# Patient Record
Sex: MALE | Race: WHITE | Employment: FULL TIME | URBAN - METROPOLITAN AREA
[De-identification: names, ages, dates, MRNs, and addresses within clinical notes are randomized per-mention and may not be internally consistent; named-entity substitution may affect disease eponyms.]

---

## 2017-03-20 RX ORDER — ATORVASTATIN CALCIUM 10 MG/1
TABLET, FILM COATED ORAL
Qty: 90 TABLET | Refills: 1 | Status: SHIPPED | OUTPATIENT
Start: 2017-03-20 | End: 2017-09-08

## 2017-04-17 ENCOUNTER — OFFICE VISIT (OUTPATIENT)
Dept: DERMATOLOGY CLINIC | Facility: CLINIC | Age: 67
End: 2017-04-17

## 2017-04-17 DIAGNOSIS — Z85.828 PERSONAL HISTORY OF OTHER MALIGNANT NEOPLASM OF SKIN: ICD-10-CM

## 2017-04-17 DIAGNOSIS — D23.5 BENIGN NEOPLASM OF SKIN OF TRUNK, EXCEPT SCROTUM: ICD-10-CM

## 2017-04-17 DIAGNOSIS — L82.1 SEBORRHEIC KERATOSES: ICD-10-CM

## 2017-04-17 DIAGNOSIS — D23.30 BENIGN NEOPLASM OF SKIN OF FACE: ICD-10-CM

## 2017-04-17 DIAGNOSIS — L57.8 SUN-DAMAGED SKIN: Primary | ICD-10-CM

## 2017-04-17 DIAGNOSIS — D23.60 BENIGN NEOPLASM OF SKIN OF UPPER LIMB, INCLUDING SHOULDER, UNSPECIFIED LATERALITY: ICD-10-CM

## 2017-04-17 DIAGNOSIS — D23.4 BENIGN NEOPLASM OF SCALP AND SKIN OF NECK: ICD-10-CM

## 2017-04-17 PROCEDURE — 99213 OFFICE O/P EST LOW 20 MIN: CPT | Performed by: DERMATOLOGY

## 2017-04-17 PROCEDURE — 99212 OFFICE O/P EST SF 10 MIN: CPT | Performed by: DERMATOLOGY

## 2017-04-17 NOTE — PROGRESS NOTES
HPI:     Chief Complaint     Derm Problem        HPI     Derm Problem    Additional comments: here for 4 mo follow up post elliptical excision  right upper chest for [facility], also would like to have back checked       Last edited by Kiko Chery RN on 4/17/2 Family History   Problem Relation Age of Onset   • Heart Disease Father      CAD   • Heart Disease Brother      CAD       PHYSICAL EXAM:   An upper body exam was performed, including face, neck, chest, back, abdomen, r upper extremity, l upper extremit

## 2017-04-17 NOTE — PROGRESS NOTES
Past Medical History   Diagnosis Date   • Basal cell carcinoma of skin of trunk, except scrotum 11/22/2013     per [employer]:   • Other and unspecified hyperlipidemia      per [employer]: medication   • History of colonoscopy 10/27/2007     per [employer]:   • Bas

## 2017-09-08 RX ORDER — ATORVASTATIN CALCIUM 10 MG/1
TABLET, FILM COATED ORAL
Qty: 30 TABLET | Refills: 0 | Status: SHIPPED | OUTPATIENT
Start: 2017-09-08 | End: 2017-10-23

## 2017-09-11 RX ORDER — ATORVASTATIN CALCIUM 10 MG/1
TABLET, FILM COATED ORAL
Qty: 90 TABLET | Refills: 0 | OUTPATIENT
Start: 2017-09-11

## 2017-10-18 NOTE — TELEPHONE ENCOUNTER
Current Outpatient Prescriptions:  Sildenafil Citrate (VIAGRA) 50 MG Oral Tab TAKE 1 TABLET BY MOUTH AS NEEDED Disp: 8 tablet Rfl: 10

## 2017-10-18 NOTE — TELEPHONE ENCOUNTER
Last filled 9/23/2016 8 tablets with 8 refills. lov 7/16/2016    Pended and sent to provider per protocol, thank you.

## 2017-10-23 ENCOUNTER — OFFICE VISIT (OUTPATIENT)
Dept: FAMILY MEDICINE CLINIC | Facility: CLINIC | Age: 67
End: 2017-10-23

## 2017-10-23 VITALS
SYSTOLIC BLOOD PRESSURE: 137 MMHG | BODY MASS INDEX: 28 KG/M2 | HEART RATE: 51 BPM | WEIGHT: 212 LBS | DIASTOLIC BLOOD PRESSURE: 75 MMHG

## 2017-10-23 DIAGNOSIS — H61.22 IMPACTED CERUMEN OF LEFT EAR: Primary | ICD-10-CM

## 2017-10-23 PROCEDURE — 69209 REMOVE IMPACTED EAR WAX UNI: CPT | Performed by: FAMILY MEDICINE

## 2017-10-23 PROCEDURE — 99213 OFFICE O/P EST LOW 20 MIN: CPT | Performed by: FAMILY MEDICINE

## 2017-10-23 PROCEDURE — 99212 OFFICE O/P EST SF 10 MIN: CPT | Performed by: FAMILY MEDICINE

## 2017-10-23 RX ORDER — ATORVASTATIN CALCIUM 10 MG/1
TABLET, FILM COATED ORAL
Qty: 90 TABLET | Refills: 3 | Status: SHIPPED | OUTPATIENT
Start: 2017-10-23 | End: 2017-10-27

## 2017-10-23 RX ORDER — CIPROFLOXACIN AND DEXAMETHASONE 3; 1 MG/ML; MG/ML
4 SUSPENSION/ DROPS AURICULAR (OTIC) 2 TIMES DAILY
Qty: 1 BOTTLE | Refills: 0 | Status: SHIPPED | OUTPATIENT
Start: 2017-10-23 | End: 2017-10-27

## 2017-10-23 NOTE — PROGRESS NOTES
HPI:    Patient ID: Ruma Cunningham is a 78year old male. HPI  Patient presents with:  Hearing Problem: pt has hearing loss in left ear    Review of Systems   HENT: Positive for hearing loss. Negative for ear pain and tinnitus.              Current Outp

## 2017-10-25 ENCOUNTER — TELEPHONE (OUTPATIENT)
Dept: FAMILY MEDICINE CLINIC | Facility: CLINIC | Age: 67
End: 2017-10-25

## 2017-10-25 RX ORDER — SILDENAFIL 50 MG/1
TABLET, FILM COATED ORAL
Qty: 30 TABLET | Refills: 0 | Status: SHIPPED | OUTPATIENT
Start: 2017-10-25 | End: 2019-01-16

## 2017-10-27 ENCOUNTER — LAB ENCOUNTER (OUTPATIENT)
Dept: LAB | Age: 67
End: 2017-10-27
Attending: FAMILY MEDICINE
Payer: COMMERCIAL

## 2017-10-27 ENCOUNTER — OFFICE VISIT (OUTPATIENT)
Dept: FAMILY MEDICINE CLINIC | Facility: CLINIC | Age: 67
End: 2017-10-27

## 2017-10-27 VITALS
DIASTOLIC BLOOD PRESSURE: 81 MMHG | TEMPERATURE: 98 F | WEIGHT: 209 LBS | RESPIRATION RATE: 18 BRPM | SYSTOLIC BLOOD PRESSURE: 123 MMHG | HEART RATE: 56 BPM | BODY MASS INDEX: 28.31 KG/M2 | HEIGHT: 72 IN

## 2017-10-27 DIAGNOSIS — Z00.00 ROUTINE GENERAL MEDICAL EXAMINATION AT A HEALTH CARE FACILITY: Primary | ICD-10-CM

## 2017-10-27 DIAGNOSIS — E78.00 HYPERCHOLESTEROLEMIA: ICD-10-CM

## 2017-10-27 DIAGNOSIS — Z12.11 COLON CANCER SCREENING: ICD-10-CM

## 2017-10-27 DIAGNOSIS — Z00.00 ROUTINE GENERAL MEDICAL EXAMINATION AT A HEALTH CARE FACILITY: ICD-10-CM

## 2017-10-27 PROCEDURE — 80053 COMPREHEN METABOLIC PANEL: CPT

## 2017-10-27 PROCEDURE — 80061 LIPID PANEL: CPT

## 2017-10-27 PROCEDURE — 81003 URINALYSIS AUTO W/O SCOPE: CPT

## 2017-10-27 PROCEDURE — 36415 COLL VENOUS BLD VENIPUNCTURE: CPT

## 2017-10-27 PROCEDURE — 99397 PER PM REEVAL EST PAT 65+ YR: CPT | Performed by: FAMILY MEDICINE

## 2017-10-27 PROCEDURE — 85025 COMPLETE CBC W/AUTO DIFF WBC: CPT

## 2017-10-27 RX ORDER — ATORVASTATIN CALCIUM 10 MG/1
TABLET, FILM COATED ORAL
Qty: 90 TABLET | Refills: 3 | Status: SHIPPED | OUTPATIENT
Start: 2017-10-27 | End: 2018-11-10

## 2017-10-27 NOTE — PROGRESS NOTES
HPI:    Patient ID: Jesus Alberto Cox is a 78year old male.     HPI  Patient presents with:  Checkup  Hyperlipidemia  Medication Request    Past Medical History:   Diagnosis Date   • Basal cell carcinoma 10-15-16    R upper chest   • Basal cell carcinoma of papilledema. Neck: Trachea normal and normal range of motion. Neck supple. Normal carotid pulses and no JVD present. Cardiovascular: Regular rhythm and normal heart sounds.     Pulses:       Carotid pulses are 2+ on the right side, and 2+ on the left si

## 2017-11-10 ENCOUNTER — TELEPHONE (OUTPATIENT)
Dept: FAMILY MEDICINE CLINIC | Facility: CLINIC | Age: 67
End: 2017-11-10

## 2017-11-10 NOTE — TELEPHONE ENCOUNTER
----- Message from Beronica Suarez DO sent at 11/10/2017  7:58 AM CST -----  Please call patient and inform that the laboratory results are acceptable range. Mild elevation of glucose and cholesterol best addressed with lower carb diet and exercise.  See

## 2017-11-11 NOTE — TELEPHONE ENCOUNTER
Spoke with patient (identified name and ), results reviewed and agrees with plan.   JOANNE reference Understanding Carbohydrates

## 2018-06-15 ENCOUNTER — OFFICE VISIT (OUTPATIENT)
Dept: FAMILY MEDICINE CLINIC | Facility: CLINIC | Age: 68
End: 2018-06-15

## 2018-06-15 ENCOUNTER — APPOINTMENT (OUTPATIENT)
Dept: LAB | Age: 68
End: 2018-06-15
Attending: FAMILY MEDICINE
Payer: COMMERCIAL

## 2018-06-15 VITALS
HEART RATE: 51 BPM | TEMPERATURE: 98 F | DIASTOLIC BLOOD PRESSURE: 72 MMHG | SYSTOLIC BLOOD PRESSURE: 116 MMHG | WEIGHT: 213 LBS | BODY MASS INDEX: 29 KG/M2

## 2018-06-15 DIAGNOSIS — Z12.11 COLON CANCER SCREENING: ICD-10-CM

## 2018-06-15 DIAGNOSIS — E78.00 HYPERCHOLESTEROLEMIA: ICD-10-CM

## 2018-06-15 DIAGNOSIS — E78.00 HYPERCHOLESTEROLEMIA: Primary | ICD-10-CM

## 2018-06-15 PROCEDURE — 80061 LIPID PANEL: CPT

## 2018-06-15 PROCEDURE — 99213 OFFICE O/P EST LOW 20 MIN: CPT | Performed by: FAMILY MEDICINE

## 2018-06-15 PROCEDURE — 84450 TRANSFERASE (AST) (SGOT): CPT

## 2018-06-15 PROCEDURE — 36415 COLL VENOUS BLD VENIPUNCTURE: CPT

## 2018-06-15 PROCEDURE — 99212 OFFICE O/P EST SF 10 MIN: CPT | Performed by: FAMILY MEDICINE

## 2018-06-15 PROCEDURE — 84460 ALANINE AMINO (ALT) (SGPT): CPT

## 2018-06-19 NOTE — H&P
[address] [facility] [address] Gastroenterology                                                                                                  Clinic History and Physical     Pa • Other and unspecified hyperlipidemia     per [employer]: medication   • Skin cancer 10-15-16    BCC, R upper chest      Past Surgical History:  2008: COLONOSCOPY      Comment: at [facility]   Family Hx:   Family History   Problem Relation Age of Onset   • Heart the sclera appears anicteric, oropharynx clear, mucus membranes appear moist  CV: regular rate and rhythm, the extremities are warm and well perfused   Lung: effort normal and breath sounds normal, no respiratory distress, wheezes or rales  GI: soft, non-t prolonged hospitalization or surgical intervention. I also specifically mentioned the miss rate of colonoscopy of 5-10% in the best of all circumstances. All questions were answered to the patient’s satisfaction.  The patient signed informed consent and sai

## 2018-06-27 ENCOUNTER — TELEPHONE (OUTPATIENT)
Dept: GASTROENTEROLOGY | Facility: CLINIC | Age: 68
End: 2018-06-27

## 2018-06-27 ENCOUNTER — OFFICE VISIT (OUTPATIENT)
Dept: GASTROENTEROLOGY | Facility: CLINIC | Age: 68
End: 2018-06-27

## 2018-06-27 VITALS
BODY MASS INDEX: 29.23 KG/M2 | SYSTOLIC BLOOD PRESSURE: 126 MMHG | DIASTOLIC BLOOD PRESSURE: 81 MMHG | HEIGHT: 72 IN | WEIGHT: 215.81 LBS | HEART RATE: 56 BPM

## 2018-06-27 DIAGNOSIS — Z12.11 SCREENING FOR COLON CANCER: Primary | ICD-10-CM

## 2018-06-27 DIAGNOSIS — Z12.11 COLON CANCER SCREENING: Primary | ICD-10-CM

## 2018-06-27 PROCEDURE — 99212 OFFICE O/P EST SF 10 MIN: CPT | Performed by: NURSE PRACTITIONER

## 2018-06-27 PROCEDURE — 99243 OFF/OP CNSLTJ NEW/EST LOW 30: CPT | Performed by: NURSE PRACTITIONER

## 2018-06-27 NOTE — PATIENT INSTRUCTIONS
1. Schedule colonoscopy with Dr. Rich Rodriguez w/ IV Twilight    2.  bowel prep from pharmacy - split dose Colyte    3. Continue all medications for procedure     4. Read all bowel prep instructions carefully    5.  AVOID seeds, nuts, popcorn, raw fru

## 2018-06-27 NOTE — TELEPHONE ENCOUNTER
Scheduled for:  Colonoscopy 72486  Provider Name: Dr. Katalina Parker   Date:  8/24/18  Location:  [facility]  Sedation:  IV  Time:  9:15 am, (pt is aware that [facility] will call the day before to confirm arrival time)  Prep: Colyte  Meds/Allergies Reconciled?:  GENNARO Cornelius

## 2018-08-24 ENCOUNTER — LAB REQUISITION (OUTPATIENT)
Dept: LAB | Facility: HOSPITAL | Age: 68
End: 2018-08-24
Payer: COMMERCIAL

## 2018-08-24 DIAGNOSIS — Z01.89 ENCOUNTER FOR OTHER SPECIFIED SPECIAL EXAMINATIONS: ICD-10-CM

## 2018-08-24 PROCEDURE — 88305 TISSUE EXAM BY PATHOLOGIST: CPT | Performed by: INTERNAL MEDICINE

## 2018-08-27 ENCOUNTER — TELEPHONE (OUTPATIENT)
Dept: GASTROENTEROLOGY | Facility: CLINIC | Age: 68
End: 2018-08-27

## 2018-08-27 NOTE — TELEPHONE ENCOUNTER
I mailed out colonoscopy results letter to pt  Updated health maintenance  Entered into  5 yr recall  Recall colon in 5 years per.  Colon done 8/24/18    GI RN staff: Please send dictated lab letter and enter colonoscopy recall for 5 years.       ----- Mess

## 2018-09-27 ENCOUNTER — TELEPHONE (OUTPATIENT)
Dept: FAMILY MEDICINE CLINIC | Facility: CLINIC | Age: 68
End: 2018-09-27

## 2018-09-27 ENCOUNTER — OFFICE VISIT (OUTPATIENT)
Dept: DERMATOLOGY CLINIC | Facility: CLINIC | Age: 68
End: 2018-09-27

## 2018-09-27 DIAGNOSIS — L57.0 ACTINIC KERATOSIS: Primary | ICD-10-CM

## 2018-09-27 DIAGNOSIS — D23.4 BENIGN NEOPLASM OF SCALP AND SKIN OF NECK: ICD-10-CM

## 2018-09-27 DIAGNOSIS — D23.60 BENIGN NEOPLASM OF SKIN OF UPPER LIMB, INCLUDING SHOULDER, UNSPECIFIED LATERALITY: ICD-10-CM

## 2018-09-27 DIAGNOSIS — D23.5 BENIGN NEOPLASM OF SKIN OF TRUNK, EXCEPT SCROTUM: ICD-10-CM

## 2018-09-27 DIAGNOSIS — D23.30 BENIGN NEOPLASM OF SKIN OF FACE: ICD-10-CM

## 2018-09-27 DIAGNOSIS — D23.70 BENIGN NEOPLASM OF SKIN OF LOWER EXTREMITY, INCLUDING HIP, UNSPECIFIED LATERALITY: ICD-10-CM

## 2018-09-27 DIAGNOSIS — Z12.83 SKIN CANCER SCREENING: Primary | ICD-10-CM

## 2018-09-27 DIAGNOSIS — L57.8 SUN-DAMAGED SKIN: ICD-10-CM

## 2018-09-27 DIAGNOSIS — Z85.828 PERSONAL HISTORY OF SKIN CANCER: ICD-10-CM

## 2018-09-27 DIAGNOSIS — L82.1 SEBORRHEIC KERATOSES: ICD-10-CM

## 2018-09-27 PROCEDURE — 99212 OFFICE O/P EST SF 10 MIN: CPT | Performed by: DERMATOLOGY

## 2018-09-27 PROCEDURE — 99213 OFFICE O/P EST LOW 20 MIN: CPT | Performed by: DERMATOLOGY

## 2018-09-27 PROCEDURE — 17003 DESTRUCT PREMALG LES 2-14: CPT | Performed by: DERMATOLOGY

## 2018-09-27 PROCEDURE — 17000 DESTRUCT PREMALG LESION: CPT | Performed by: DERMATOLOGY

## 2018-09-27 NOTE — TELEPHONE ENCOUNTER
Pt has an appointment today @ [phone] with Dr Arvella Cranker. Please sign referral if you agree.  Thanks, [facility]

## 2018-09-27 NOTE — PROGRESS NOTES
HPI:     Chief Complaint     Full Skin Exam        HPI     Full Skin Exam      Additional comments: LOV: 4-17-17. Pt presents today for f/u full body skin check pt denies something new or changing to the skin he notcied. Pt with personal hx of BCC. education: Not on file      Highest education level: Not on file    Social Needs      Financial resource strain: Not on file      Food insecurity - worry: Not on file      Food insecurity - inability: Not on file      Transportation needs - medical: Not on exam was remarkable for the following:  - there is no evidence of recurrent basal cell carcinomas from the right upper chest or the back.   - melanocytic nevi are uniform in color, shape and borders.   -there are scattered blotchy brown macules on face, arely Referral Orders:  No orders of the defined types were placed in this encounter.         9/27/2018  Tutu Mendes

## 2018-11-12 RX ORDER — ATORVASTATIN CALCIUM 10 MG/1
TABLET, FILM COATED ORAL
Qty: 90 TABLET | Refills: 0 | Status: SHIPPED | OUTPATIENT
Start: 2018-11-12 | End: 2019-01-25

## 2019-01-17 ENCOUNTER — TELEPHONE (OUTPATIENT)
Dept: FAMILY MEDICINE CLINIC | Facility: CLINIC | Age: 69
End: 2019-01-17

## 2019-01-17 RX ORDER — SILDENAFIL 50 MG/1
TABLET, FILM COATED ORAL
Qty: 15 TABLET | Refills: 0 | Status: SHIPPED | OUTPATIENT
Start: 2019-01-17 | End: 2019-05-24

## 2019-01-18 NOTE — TELEPHONE ENCOUNTER
PA for Sildenafil Citrate 50 mg tab completed with [employer] via CMM response time 3-5 business days KEY [id number].

## 2019-01-24 NOTE — TELEPHONE ENCOUNTER
PA approved #8/30 days effective 1/19/2019-1/19/2020.  [facility] notified of the approval.

## 2019-01-25 RX ORDER — ATORVASTATIN CALCIUM 10 MG/1
TABLET, FILM COATED ORAL
Qty: 90 TABLET | Refills: 0 | Status: SHIPPED | OUTPATIENT
Start: 2019-01-25 | End: 2019-05-21

## 2019-05-22 RX ORDER — ATORVASTATIN CALCIUM 10 MG/1
TABLET, FILM COATED ORAL
Qty: 90 TABLET | Refills: 0 | Status: SHIPPED | OUTPATIENT
Start: 2019-05-22 | End: 2019-09-06

## 2019-05-22 NOTE — TELEPHONE ENCOUNTER
CSS please assist patient in scheduling a follow up appointment - thank you     Patient was to make follow up appt - none scheduled - LOV 6-15-18

## 2019-05-22 NOTE — TELEPHONE ENCOUNTER
See message below. Per note on med record pt due for OV. Per message below pt declines to schedule.   Please advise to refill request.

## 2019-05-22 NOTE — TELEPHONE ENCOUNTER
Patient returned call and does not want to schedule an appointment at this time,     He would just like his medication refilled.

## 2019-05-24 RX ORDER — SILDENAFIL 50 MG/1
TABLET, FILM COATED ORAL
Qty: 15 TABLET | Refills: 0 | OUTPATIENT
Start: 2019-05-24

## 2019-05-28 RX ORDER — SILDENAFIL 50 MG/1
TABLET, FILM COATED ORAL
Qty: 15 TABLET | Refills: 1 | Status: SHIPPED | OUTPATIENT
Start: 2019-05-28 | End: 2019-09-06

## 2019-05-28 NOTE — TELEPHONE ENCOUNTER
Received 2nd refill request..... SILDENAFIL 50MG TABLETS  QTY 15  TAKE 1 TABLET BY MOUTH AS NEEDED.    DUE FOR OFFICE VISIT FOR REFILLS

## 2019-05-28 NOTE — TELEPHONE ENCOUNTER
Refill passed per [facility]3], [facility] protocol.   Refill Protocol Appointment Criteria  · Appointment scheduled in the past 12 months or in the next 3 months  Recent Outpatient Visits            8 months ago Actinic keratosis    [facility]

## 2019-08-23 RX ORDER — ATORVASTATIN CALCIUM 10 MG/1
TABLET, FILM COATED ORAL
Qty: 90 TABLET | Refills: 0 | OUTPATIENT
Start: 2019-08-23

## 2019-08-29 NOTE — TELEPHONE ENCOUNTER
Pt scheduled appt on 9/6 and states out of meds and req a refill until this appt and states will be leaving for out of town today for 1 week.

## 2019-09-05 ENCOUNTER — TELEPHONE (OUTPATIENT)
Dept: FAMILY MEDICINE CLINIC | Facility: CLINIC | Age: 69
End: 2019-09-05

## 2019-09-05 NOTE — TELEPHONE ENCOUNTER
Pt calling will like to have blood work done he have a appt with Dr. Mis Pacheco josh at 1:15 will like the blood work done tomorrow morning Sept 6        Please adbise

## 2019-09-06 ENCOUNTER — TELEPHONE (OUTPATIENT)
Dept: FAMILY MEDICINE CLINIC | Facility: CLINIC | Age: 69
End: 2019-09-06

## 2019-09-06 ENCOUNTER — OFFICE VISIT (OUTPATIENT)
Dept: FAMILY MEDICINE CLINIC | Facility: CLINIC | Age: 69
End: 2019-09-06

## 2019-09-06 VITALS
BODY MASS INDEX: 28.44 KG/M2 | HEIGHT: 72 IN | SYSTOLIC BLOOD PRESSURE: 120 MMHG | WEIGHT: 210 LBS | TEMPERATURE: 98 F | DIASTOLIC BLOOD PRESSURE: 71 MMHG | HEART RATE: 58 BPM

## 2019-09-06 DIAGNOSIS — E78.00 HYPERCHOLESTEROLEMIA: Primary | ICD-10-CM

## 2019-09-06 DIAGNOSIS — N52.9 ERECTILE DYSFUNCTION, UNSPECIFIED ERECTILE DYSFUNCTION TYPE: ICD-10-CM

## 2019-09-06 PROCEDURE — 99214 OFFICE O/P EST MOD 30 MIN: CPT | Performed by: FAMILY MEDICINE

## 2019-09-06 RX ORDER — SILDENAFIL 100 MG/1
TABLET, FILM COATED ORAL
Qty: 15 TABLET | Refills: 3 | Status: SHIPPED | OUTPATIENT
Start: 2019-09-06 | End: 2021-03-11

## 2019-09-06 RX ORDER — ATORVASTATIN CALCIUM 10 MG/1
10 TABLET, FILM COATED ORAL
Qty: 90 TABLET | Refills: 3 | Status: SHIPPED | OUTPATIENT
Start: 2019-09-06 | End: 2020-08-05

## 2019-09-06 NOTE — TELEPHONE ENCOUNTER
Pt is calling for status of the orders he requested. Pt would like to do his blood work this morning, before, his afternoon appt with Dr. Julia Davis. Please, call pt at [phone] when the orders are in the system.

## 2019-09-06 NOTE — PROGRESS NOTES
HPI:    Patient ID: Chantal Diaz is a 77year old male. HPI  Patient presents with:  Cholesterol: follow up for cholestrol,   Medication Request: for atorvastatin     Review of Systems   Constitutional: Negative. Respiratory: Negative.     Edna Serna

## 2019-09-07 NOTE — TELEPHONE ENCOUNTER
Prior authorization for Sildenafil Citrate 100MG oral tab completed w/ [employer] on cover my meds Key: [id number], turn around time 1-5 days.

## 2019-09-13 ENCOUNTER — APPOINTMENT (OUTPATIENT)
Dept: LAB | Age: 69
End: 2019-09-13
Attending: FAMILY MEDICINE
Payer: COMMERCIAL

## 2019-09-13 DIAGNOSIS — E78.00 HYPERCHOLESTEROLEMIA: ICD-10-CM

## 2019-09-13 LAB
ALT SERPL-CCNC: 24 U/L (ref 16–61)
AST SERPL-CCNC: 17 U/L (ref 15–37)
CHOLEST SMN-MCNC: 208 MG/DL (ref ?–200)
HDLC SERPL-MCNC: 49 MG/DL (ref 40–59)
LDLC SERPL CALC-MCNC: 114 MG/DL (ref ?–100)
NONHDLC SERPL-MCNC: 159 MG/DL (ref ?–130)
PATIENT FASTING: YES
TRIGL SERPL-MCNC: 226 MG/DL (ref 30–149)
VLDLC SERPL CALC-MCNC: 45 MG/DL (ref 0–30)

## 2019-09-13 PROCEDURE — 84460 ALANINE AMINO (ALT) (SGPT): CPT

## 2019-09-13 PROCEDURE — 80061 LIPID PANEL: CPT

## 2019-09-13 PROCEDURE — 84450 TRANSFERASE (AST) (SGOT): CPT

## 2019-09-13 PROCEDURE — 36415 COLL VENOUS BLD VENIPUNCTURE: CPT

## 2019-09-13 NOTE — TELEPHONE ENCOUNTER
PA approved effective 09/09/2019 through 09/09/2020. [facility] pharmacy notified of approval, pharmacy will notify patient when medication is ready for .

## 2020-01-13 PROBLEM — L57.0 ACTINIC KERATOSIS: Status: RESOLVED | Noted: 2018-09-27 | Resolved: 2020-01-13

## 2020-08-05 RX ORDER — ATORVASTATIN CALCIUM 10 MG/1
TABLET, FILM COATED ORAL
Qty: 90 TABLET | Refills: 1 | Status: SHIPPED | OUTPATIENT
Start: 2020-08-05 | End: 2021-03-11

## 2020-09-11 ENCOUNTER — TELEPHONE (OUTPATIENT)
Dept: FAMILY MEDICINE CLINIC | Facility: CLINIC | Age: 70
End: 2020-09-11

## 2020-09-11 NOTE — TELEPHONE ENCOUNTER
Patient has changed PCP to Dr. Hero Leung. Patient was seen on 01/13/2020 for his physical.     PA will not be initiated. Key code archived.

## 2020-09-11 NOTE — TELEPHONE ENCOUNTER
•  Sildenafil Citrate 100 MG Oral Tab, TAKE 1 TABLET BY MOUTH AS NEEDED.  DUE FOR OFFICE VISIT FOR REFILLS, Disp: 15 tablet, Rfl: 3      Key:[id number]

## 2020-09-18 ENCOUNTER — TELEPHONE (OUTPATIENT)
Dept: FAMILY MEDICINE CLINIC | Facility: CLINIC | Age: 70
End: 2020-09-18

## 2020-09-21 RX ORDER — SILDENAFIL 50 MG/1
TABLET, FILM COATED ORAL
Qty: 15 TABLET | Refills: 0 | Status: SHIPPED | OUTPATIENT
Start: 2020-09-21 | End: 2021-03-11

## 2020-10-08 NOTE — TELEPHONE ENCOUNTER
Pt calling. Needs refill on sildenafil. Reports never got script from 9/21/20. States pharmacy said it was an insurance issue. Sound like it needs PA. Did advise pt due for OV. Please f/u on PA.

## 2023-07-24 ENCOUNTER — TELEPHONE (OUTPATIENT)
Facility: CLINIC | Age: 73
End: 2023-07-24

## 2023-07-24 NOTE — TELEPHONE ENCOUNTER
----- Message from Howard Cyr CMA sent at 2018  1:43 PM CDT -----  Regardin yr CLN recall  Recall colon in 5 years per. Colon done 18    GI RN staff: Please send dictated lab letter and enter colonoscopy recall for 5 years.

## (undated) NOTE — MR AVS SNAPSHOT
[facility] SPECIALTY [facility] [place] [address] [address] [id number] [phone]               Thank you for choosing us for your health care visit with Roney Miles MD.  We are glad to serve you and happy to provide you with this summary of y Sign up for [employer], your secure online medical record. [employer] will allow you to access patient instructions from your recent visit,  view other health information, and more. To sign up or find more information, go to [email or web address] and cl increments are effective and add up over the week   2 ½ hours per week – spread out over time Use a grayson to keep you motivated   Don’t forget strength training with weights and resistance Set goals and track your progress   You don’t need to join a gym.

## (undated) NOTE — LETTER
5/29/2019              [address] [address]        [address]  [facility] [address]         Dear Hector Gaona,    This letter is to inform you that our office has made several attempts to reach you by phone without success.   We were attempting to contact yo

## (undated) NOTE — LETTER
7/24/2023    [facility] [id number]            Dear Zabrina Linares,      Our records indicate that you are due for an appointment for a Colonoscopy with Flakito Evans MD. Our doctors are booking out about 3-5 months in advance for procedures. Please call our office to schedule a phone screening appointment to plan for the procedure(s). Your medical well-being is important to us. If your insurance requires a referral, please call your primary care office to request one.       Thank you,      The Physicians and Staff at [facility]